# Patient Record
Sex: MALE | Race: WHITE | URBAN - NONMETROPOLITAN AREA
[De-identification: names, ages, dates, MRNs, and addresses within clinical notes are randomized per-mention and may not be internally consistent; named-entity substitution may affect disease eponyms.]

---

## 2017-08-20 PROBLEM — M54.9 BACK PAIN: Status: ACTIVE | Noted: 2017-08-20

## 2017-08-20 PROBLEM — M54.2 NECK PAIN: Status: ACTIVE | Noted: 2017-08-20

## 2017-08-20 PROBLEM — K21.9 GERD (GASTROESOPHAGEAL REFLUX DISEASE): Status: ACTIVE | Noted: 2017-08-20

## 2017-08-20 PROBLEM — E78.2 MIXED HYPERLIPIDEMIA: Status: ACTIVE | Noted: 2017-03-15

## 2018-02-22 PROBLEM — H52.03: Noted: 2018-02-22

## 2018-02-22 PROBLEM — H52.4: Noted: 2018-02-22

## 2018-02-22 PROBLEM — H52.223: Noted: 2018-02-22

## 2019-04-04 ENCOUNTER — IMPORTED ENCOUNTER (OUTPATIENT)
Dept: URBAN - NONMETROPOLITAN AREA CLINIC 1 | Facility: CLINIC | Age: 64
End: 2019-04-04

## 2019-04-04 PROCEDURE — 92015 DETERMINE REFRACTIVE STATE: CPT

## 2019-04-04 PROCEDURE — 92014 COMPRE OPH EXAM EST PT 1/>: CPT

## 2019-04-04 NOTE — PATIENT DISCUSSION
Hyperopia-Discussed diagnosis with patient. Astigmatism-Discussed diagnosis with patient. Presbyopia-Discussed diagnosis with patient. Updated spec Rx given.

## 2019-07-10 PROBLEM — M19.039 LOCALIZED PRIMARY OSTEOARTHRITIS OF WRIST: Status: ACTIVE | Noted: 2019-07-10

## 2019-07-10 PROBLEM — M65.30 ACQUIRED TRIGGER FINGER: Status: ACTIVE | Noted: 2019-07-10

## 2019-07-10 PROBLEM — D48.5 NEOPLASM OF UNCERTAIN BEHAVIOR OF SKIN: Status: ACTIVE | Noted: 2019-07-10

## 2019-11-13 ENCOUNTER — ANESTHESIA EVENT (OUTPATIENT)
Dept: SURGERY | Age: 64
End: 2019-11-13
Payer: COMMERCIAL

## 2019-11-13 NOTE — PERIOP NOTES
PAT - SURGICAL PRE-ADMISSION INSTRUCTIONS    NAME:  Chana Hernandez. TODAY'S DATE:  11/13/2019    SURGERY DATE:  11/14/2019                                  SURGERY ARRIVAL TIME:   1200 TBV    1. Do NOT eat or drink anything, including candy or gum, after MIDNIGHT on 11/13/19 , unless you have specific instructions from your Surgeon or Anesthesia Provider to do so. 2. No smoking on the day of surgery. 3. No alcohol 24 hours prior to the day of surgery. 4. No recreational drugs for one week prior to the day of surgery. 5. Leave all valuables, including money/purse, at home. 6. Remove all jewelry, nail polish, makeup (including mascara); no lotions, powders, deodorant, or perfume/cologne/after shave. 7. Glasses/Contact lenses and Dentures may be worn to the hospital.  They will be removed prior to surgery. 8. Call your doctor if symptoms of a cold or illness develop within 24 ours prior to surgery. 9. AN ADULT MUST DRIVE YOU HOME AFTER OUTPATIENT SURGERY. 10. If you are having an OUTPATIENT procedure, please make arrangements for a responsible adult to be with you for 24 hours after your surgery. 11. If you are admitted to the hospital, you will be assigned to a bed after surgery is complete. Normally a family member will not be able to see you until you are in your assigned bed. 15. Family is encouraged to accompany you to the hospital.  We ask visitors in the treatment area to be limited to ONE person at a time to ensure patient privacy. EXCEPTIONS WILL BE MADE AS NEEDED. 15. Children under 12 are discouraged from entering the treatment area and need to be supervised by an adult when in the waiting room. Special Instructions:    NONE. Patient Prep:    shower with anti-bacterial soap    These surgical instructions were reviewed with PATIENT during the PAT PHONE CALL. Directions:   On the morning of surgery, please go to the [facility]. Enter the building from the [facility] entrance, 1st floor (next to the Emergency Room entrance). Take the elevator to the 2nd floor. Sign in at the Registration Desk.     If you have any questions and/or concerns, please do not hesitate to call:  (Prior to the day of surgery)  [facility] unit:  [phone]  (Day of surgery)  [facility] unit:  [phone]

## 2019-11-14 ENCOUNTER — HOSPITAL ENCOUNTER (OUTPATIENT)
Age: 64
Discharge: HOME | End: 2019-11-15
Attending: UROLOGY | Admitting: UROLOGY
Payer: COMMERCIAL

## 2019-11-14 ENCOUNTER — ANESTHESIA (OUTPATIENT)
Dept: SURGERY | Age: 64
End: 2019-11-14
Payer: COMMERCIAL

## 2019-11-14 DIAGNOSIS — C61 PROSTATE CANCER (HCC): Primary | ICD-10-CM

## 2019-11-14 PROBLEM — N52.9 ED (ERECTILE DYSFUNCTION): Status: ACTIVE | Noted: 2019-11-14

## 2019-11-14 PROCEDURE — C1815 PROS, URINARY SPH, IMP: HCPCS | Performed by: UROLOGY

## 2019-11-14 PROCEDURE — 77030031139 HC SUT VCRL2 J&J -A: Performed by: UROLOGY

## 2019-11-14 PROCEDURE — 77030008683 HC TU ET CUF COVD -A: Performed by: ANESTHESIOLOGY

## 2019-11-14 PROCEDURE — 77030002933 HC SUT MCRYL J&J -A: Performed by: UROLOGY

## 2019-11-14 PROCEDURE — 74011250636 HC RX REV CODE- 250/636: Performed by: NURSE ANESTHETIST, CERTIFIED REGISTERED

## 2019-11-14 PROCEDURE — 76010000131 HC OR TIME 2 TO 2.5 HR: Performed by: UROLOGY

## 2019-11-14 PROCEDURE — 77030010545: Performed by: UROLOGY

## 2019-11-14 PROCEDURE — 77030018836 HC SOL IRR NACL ICUM -A: Performed by: UROLOGY

## 2019-11-14 PROCEDURE — 77030018823 HC SLV COMPR VENO -B: Performed by: UROLOGY

## 2019-11-14 PROCEDURE — 74011000250 HC RX REV CODE- 250: Performed by: NURSE ANESTHETIST, CERTIFIED REGISTERED

## 2019-11-14 PROCEDURE — 65270000029 HC RM PRIVATE

## 2019-11-14 PROCEDURE — 74011000272 HC RX REV CODE- 272: Performed by: UROLOGY

## 2019-11-14 PROCEDURE — 77030034696 HC CATH URETH FOL 2W BARD -A: Performed by: UROLOGY

## 2019-11-14 PROCEDURE — 76210000016 HC OR PH I REC 1 TO 1.5 HR: Performed by: UROLOGY

## 2019-11-14 PROCEDURE — 77030008477 HC STYL SATN SLP COVD -A: Performed by: ANESTHESIOLOGY

## 2019-11-14 PROCEDURE — 74011250636 HC RX REV CODE- 250/636: Performed by: UROLOGY

## 2019-11-14 PROCEDURE — 74011250636 HC RX REV CODE- 250/636: Performed by: STUDENT IN AN ORGANIZED HEALTH CARE EDUCATION/TRAINING PROGRAM

## 2019-11-14 PROCEDURE — 74011250637 HC RX REV CODE- 250/637: Performed by: NURSE ANESTHETIST, CERTIFIED REGISTERED

## 2019-11-14 PROCEDURE — 77030013079 HC BLNKT BAIR HGGR 3M -A: Performed by: ANESTHESIOLOGY

## 2019-11-14 PROCEDURE — 76060000035 HC ANESTHESIA 2 TO 2.5 HR: Performed by: UROLOGY

## 2019-11-14 PROCEDURE — 74011000258 HC RX REV CODE- 258: Performed by: STUDENT IN AN ORGANIZED HEALTH CARE EDUCATION/TRAINING PROGRAM

## 2019-11-14 DEVICE — CONTROL PUMP WITH INHIBIZONE
Type: IMPLANTABLE DEVICE | Site: PENIS | Status: FUNCTIONAL
Brand: AMS 800 ARTIFICIAL URINARY SPHINCTER

## 2019-11-14 RX ORDER — KETOROLAC TROMETHAMINE 30 MG/ML
30 INJECTION, SOLUTION INTRAMUSCULAR; INTRAVENOUS
Status: DISPENSED | OUTPATIENT
Start: 2019-11-14 | End: 2019-11-15

## 2019-11-14 RX ORDER — CEFAZOLIN SODIUM 2 G/50ML
2 SOLUTION INTRAVENOUS
Status: DISCONTINUED | OUTPATIENT
Start: 2019-11-14 | End: 2019-11-14

## 2019-11-14 RX ORDER — PROPOFOL 10 MG/ML
INJECTION, EMULSION INTRAVENOUS AS NEEDED
Status: DISCONTINUED | OUTPATIENT
Start: 2019-11-14 | End: 2019-11-14 | Stop reason: HOSPADM

## 2019-11-14 RX ORDER — FAMOTIDINE 20 MG/1
20 TABLET, FILM COATED ORAL ONCE
Status: COMPLETED | OUTPATIENT
Start: 2019-11-14 | End: 2019-11-14

## 2019-11-14 RX ORDER — DEXAMETHASONE SODIUM PHOSPHATE 4 MG/ML
INJECTION, SOLUTION INTRA-ARTICULAR; INTRALESIONAL; INTRAMUSCULAR; INTRAVENOUS; SOFT TISSUE AS NEEDED
Status: DISCONTINUED | OUTPATIENT
Start: 2019-11-14 | End: 2019-11-14 | Stop reason: HOSPADM

## 2019-11-14 RX ORDER — FENTANYL CITRATE 50 UG/ML
INJECTION, SOLUTION INTRAMUSCULAR; INTRAVENOUS AS NEEDED
Status: DISCONTINUED | OUTPATIENT
Start: 2019-11-14 | End: 2019-11-14 | Stop reason: HOSPADM

## 2019-11-14 RX ORDER — FENTANYL CITRATE 50 UG/ML
50 INJECTION, SOLUTION INTRAMUSCULAR; INTRAVENOUS AS NEEDED
Status: DISCONTINUED | OUTPATIENT
Start: 2019-11-14 | End: 2019-11-14

## 2019-11-14 RX ORDER — NALOXONE HYDROCHLORIDE 0.4 MG/ML
0.4 INJECTION, SOLUTION INTRAMUSCULAR; INTRAVENOUS; SUBCUTANEOUS AS NEEDED
Status: DISCONTINUED | OUTPATIENT
Start: 2019-11-14 | End: 2019-11-15 | Stop reason: HOSPADM

## 2019-11-14 RX ORDER — ONDANSETRON 2 MG/ML
4 INJECTION INTRAMUSCULAR; INTRAVENOUS
Status: DISCONTINUED | OUTPATIENT
Start: 2019-11-14 | End: 2019-11-15 | Stop reason: HOSPADM

## 2019-11-14 RX ORDER — HYDROMORPHONE HYDROCHLORIDE 1 MG/ML
1 INJECTION, SOLUTION INTRAMUSCULAR; INTRAVENOUS; SUBCUTANEOUS
Status: DISCONTINUED | OUTPATIENT
Start: 2019-11-14 | End: 2019-11-15 | Stop reason: HOSPADM

## 2019-11-14 RX ORDER — SODIUM CHLORIDE, SODIUM LACTATE, POTASSIUM CHLORIDE, CALCIUM CHLORIDE 600; 310; 30; 20 MG/100ML; MG/100ML; MG/100ML; MG/100ML
125 INJECTION, SOLUTION INTRAVENOUS CONTINUOUS
Status: DISCONTINUED | OUTPATIENT
Start: 2019-11-14 | End: 2019-11-14

## 2019-11-14 RX ORDER — SODIUM CHLORIDE 0.9 % (FLUSH) 0.9 %
5-40 SYRINGE (ML) INJECTION EVERY 8 HOURS
Status: DISCONTINUED | OUTPATIENT
Start: 2019-11-14 | End: 2019-11-14 | Stop reason: HOSPADM

## 2019-11-14 RX ORDER — OXYCODONE AND ACETAMINOPHEN 5; 325 MG/1; MG/1
1 TABLET ORAL
Status: DISCONTINUED | OUTPATIENT
Start: 2019-11-14 | End: 2019-11-15 | Stop reason: HOSPADM

## 2019-11-14 RX ORDER — SODIUM CHLORIDE 0.9 % (FLUSH) 0.9 %
5-40 SYRINGE (ML) INJECTION AS NEEDED
Status: DISCONTINUED | OUTPATIENT
Start: 2019-11-14 | End: 2019-11-15 | Stop reason: HOSPADM

## 2019-11-14 RX ORDER — SODIUM CHLORIDE 0.9 % (FLUSH) 0.9 %
5-40 SYRINGE (ML) INJECTION EVERY 8 HOURS
Status: DISCONTINUED | OUTPATIENT
Start: 2019-11-14 | End: 2019-11-15 | Stop reason: HOSPADM

## 2019-11-14 RX ORDER — SODIUM CHLORIDE, SODIUM LACTATE, POTASSIUM CHLORIDE, CALCIUM CHLORIDE 600; 310; 30; 20 MG/100ML; MG/100ML; MG/100ML; MG/100ML
25 INJECTION, SOLUTION INTRAVENOUS CONTINUOUS
Status: DISCONTINUED | OUTPATIENT
Start: 2019-11-14 | End: 2019-11-14 | Stop reason: HOSPADM

## 2019-11-14 RX ORDER — SODIUM CHLORIDE 0.9 % (FLUSH) 0.9 %
5-40 SYRINGE (ML) INJECTION AS NEEDED
Status: DISCONTINUED | OUTPATIENT
Start: 2019-11-14 | End: 2019-11-14 | Stop reason: HOSPADM

## 2019-11-14 RX ORDER — HYDROMORPHONE HYDROCHLORIDE 1 MG/ML
0.5 INJECTION, SOLUTION INTRAMUSCULAR; INTRAVENOUS; SUBCUTANEOUS
Status: DISCONTINUED | OUTPATIENT
Start: 2019-11-14 | End: 2019-11-14 | Stop reason: HOSPADM

## 2019-11-14 RX ORDER — SUCCINYLCHOLINE CHLORIDE 100 MG/5ML
SYRINGE (ML) INTRAVENOUS AS NEEDED
Status: DISCONTINUED | OUTPATIENT
Start: 2019-11-14 | End: 2019-11-14 | Stop reason: HOSPADM

## 2019-11-14 RX ORDER — MIDAZOLAM HYDROCHLORIDE 1 MG/ML
INJECTION, SOLUTION INTRAMUSCULAR; INTRAVENOUS AS NEEDED
Status: DISCONTINUED | OUTPATIENT
Start: 2019-11-14 | End: 2019-11-14 | Stop reason: HOSPADM

## 2019-11-14 RX ORDER — HYDROMORPHONE HYDROCHLORIDE 1 MG/ML
0.5 INJECTION, SOLUTION INTRAMUSCULAR; INTRAVENOUS; SUBCUTANEOUS
Status: DISCONTINUED | OUTPATIENT
Start: 2019-11-14 | End: 2019-11-14

## 2019-11-14 RX ORDER — ONDANSETRON 2 MG/ML
4 INJECTION INTRAMUSCULAR; INTRAVENOUS ONCE
Status: DISCONTINUED | OUTPATIENT
Start: 2019-11-14 | End: 2019-11-14

## 2019-11-14 RX ORDER — ONDANSETRON 2 MG/ML
INJECTION INTRAMUSCULAR; INTRAVENOUS AS NEEDED
Status: DISCONTINUED | OUTPATIENT
Start: 2019-11-14 | End: 2019-11-14 | Stop reason: HOSPADM

## 2019-11-14 RX ORDER — LIDOCAINE HYDROCHLORIDE 10 MG/ML
0.1 INJECTION, SOLUTION EPIDURAL; INFILTRATION; INTRACAUDAL; PERINEURAL AS NEEDED
Status: DISCONTINUED | OUTPATIENT
Start: 2019-11-14 | End: 2019-11-14 | Stop reason: HOSPADM

## 2019-11-14 RX ORDER — LIDOCAINE HYDROCHLORIDE 20 MG/ML
INJECTION, SOLUTION EPIDURAL; INFILTRATION; INTRACAUDAL; PERINEURAL AS NEEDED
Status: DISCONTINUED | OUTPATIENT
Start: 2019-11-14 | End: 2019-11-14 | Stop reason: HOSPADM

## 2019-11-14 RX ADMIN — LIDOCAINE HYDROCHLORIDE 50 MG: 20 INJECTION, SOLUTION INTRAVENOUS at 13:34

## 2019-11-14 RX ADMIN — SODIUM CHLORIDE, SODIUM LACTATE, POTASSIUM CHLORIDE, AND CALCIUM CHLORIDE 25 ML/HR: 600; 310; 30; 20 INJECTION, SOLUTION INTRAVENOUS at 12:48

## 2019-11-14 RX ADMIN — FAMOTIDINE 20 MG: 20 TABLET ORAL at 12:47

## 2019-11-14 RX ADMIN — Medication 100 MG: at 13:34

## 2019-11-14 RX ADMIN — FENTANYL CITRATE 100 MCG: 50 INJECTION, SOLUTION INTRAMUSCULAR; INTRAVENOUS at 13:34

## 2019-11-14 RX ADMIN — PROPOFOL 160 MG: 10 INJECTION, EMULSION INTRAVENOUS at 13:34

## 2019-11-14 RX ADMIN — KETOROLAC TROMETHAMINE 30 MG: 30 INJECTION, SOLUTION INTRAMUSCULAR at 16:43

## 2019-11-14 RX ADMIN — MIDAZOLAM 2 MG: 1 INJECTION INTRAMUSCULAR; INTRAVENOUS at 13:29

## 2019-11-14 RX ADMIN — ONDANSETRON 4 MG: 2 SOLUTION INTRAMUSCULAR; INTRAVENOUS at 13:49

## 2019-11-14 RX ADMIN — DEXAMETHASONE SODIUM PHOSPHATE 4 MG: 4 INJECTION, SOLUTION INTRA-ARTICULAR; INTRALESIONAL; INTRAMUSCULAR; INTRAVENOUS; SOFT TISSUE at 13:49

## 2019-11-14 RX ADMIN — TOBRAMYCIN SULFATE 365 MG: 40 INJECTION, SOLUTION INTRAMUSCULAR; INTRAVENOUS at 13:45

## 2019-11-14 RX ADMIN — SODIUM CHLORIDE 1250 MG: 900 INJECTION, SOLUTION INTRAVENOUS at 13:46

## 2019-11-14 NOTE — ANESTHESIA POSTPROCEDURE EVALUATION
Procedure(s):  Cystoscopy ARTIFICIAL URINARY  SPHINCTER. general    Anesthesia Post Evaluation      Multimodal analgesia: multimodal analgesia used between 6 hours prior to anesthesia start to PACU discharge  Patient location during evaluation: bedside  Patient participation: complete - patient participated  Level of consciousness: awake  Pain management: adequate  Airway patency: patent  Anesthetic complications: no  Cardiovascular status: stable  Respiratory status: acceptable  Hydration status: acceptable  Post anesthesia nausea and vomiting:  controlled      Vitals Value Taken Time   /74 11/14/2019  4:48 PM   Temp 36.3 °C (97.4 °F) 11/14/2019  4:00 PM   Pulse 63 11/14/2019  4:55 PM   Resp 8 11/14/2019  4:55 PM   SpO2 97 % 11/14/2019  4:55 PM   Vitals shown include unvalidated device data.

## 2019-11-14 NOTE — PERIOP NOTES
Pre-Op Summary    Pt arrived via car with family/friend and is oriented to time, place, person and situation. Patient with steady gait with none assistive devices. Visit Vitals  /84 (BP 1 Location: Left arm, BP Patient Position: At rest)   Pulse 63   Temp 98.1 °F (36.7 °C)   Resp 18   Ht 5' 10\" (1.778 m)   Wt 83.5 kg (184 lb)   SpO2 100%   BMI 26.40 kg/m²       Peripheral IV located on Left hand . Patients belongings are located with wife. Patient's point of contact is Morelia Ashley and their contact number is: [phone]. They will be in the waiting room. They are able to receive medication information. They will be their ride home.

## 2019-11-14 NOTE — ANESTHESIA PREPROCEDURE EVALUATION
Relevant Problems   No relevant active problems       Anesthetic History   No history of anesthetic complications            Review of Systems / Medical History  Patient summary reviewed and pertinent labs reviewed    Pulmonary  Within defined limits                 Neuro/Psych   Within defined limits           Cardiovascular  Within defined limits                Exercise tolerance: >4 METS     GI/Hepatic/Renal     GERD: well controlled           Endo/Other  Within defined limits           Other Findings   Comments:                  Physical Exam    Airway  Mallampati: I  TM Distance: 4 - 6 cm  Neck ROM: normal range of motion   Mouth opening: Normal     Cardiovascular  Regular rate and rhythm,  S1 and S2 normal,  no murmur, click, rub, or gallop  Rhythm: regular  Rate: normal         Dental  No notable dental hx       Pulmonary  Breath sounds clear to auscultation               Abdominal  GI exam deferred       Other Findings            Anesthetic Plan    ASA: 2  Anesthesia type: general          Induction: Intravenous  Anesthetic plan and risks discussed with: Patient

## 2019-11-14 NOTE — ROUTINE PROCESS
R/T [employer]. Will give BS report at [facility]. VSS. Oscar/Penis wnl. Pain well controlled.

## 2019-11-14 NOTE — H&P
Jhoana Jorgensen MD   Physician   Urology   Progress Notes   Signed   Encounter Date:  10/30/2019                    []Hide copied text    []Ivanver for details    10/30/2019      ASSESSMENT:       ICD-10-CM ICD-9-CM     1. Stress incontinence, male N39.3 788.32 AMB POC URINALYSIS DIP STICK AUTO W/O MICRO         CYSTOURETHROSCOPY   2. 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 4     3. Decreased bladder capacity N32.89 596.89     4. Erectile dysfunction, unspecified erectile dysfunction type N52.9 607.84     5. [facility] C61 185 CYSTOURETHROSCOPY   6. Recurrent UTI N39.0 599.0 URINE C&S      PLAN:    1. UA today unremarkable. 2. UDS 9/9/19 suggestive of hypotonic bladder and reduced bladder capacity. 3. Cystoscopy today 10/30/19: Good Kegels. Excellent VUA, wide open c/w excellent results from DVP. ; VUA without obstruction on retroflexion. No tumors, stones or masses. 4. Reviewed surgical management options for NAHID: urethral sling vs AUS. R/b and postop expectations outlined of each. Patient desires to proceed with AUS placement. Surgery letter placed. 5. As he has a hypotonic bladder with reduced bladder capacity established by UDS, he will likely have to void by Crede or Valsalva to empty bladder completely. 6. Will resume ED conversation after AUS has been placed.      Will call to schedule procedure.     Body mass index is 27.41 kg/m². Patient's BMI is out of the normal parameters. Information about BMI was given to the patient.      For the purpose of documentation, I spent >37 minutes with the patient, and more than half of this time was spent in counseling, education, coordination of care, discussion of coordination of care and discussion of other issues of importance for the care of the patient.          Chief Complaint   Patient presents with    Stress Incontinence       Pt here for Cysto today.  Pt denies any symptoms of UTI. pPt still has same symptoms no change.       HISTORY OF PRESENT ILLNESS:  Dillan Izaguirre is a 59 y.o. male with h/o [id number] [place] 4+3(5) adenocarcinoma of the prostate s/p RALP 10/20/17 who presents today in follow up for NAHID.      Accompanied by wife. Pt presents today for cystoscopy and discuss AUS. He reports progressive NAHID which he currently manages with 4 ppd. Avoids using penile clamp in the PM or when he is more active as it causes penile bleeding and irritation. Regarding his erectile status, he has been using NALINI. Notices a 35-40 degree penile curvature with tumescence and feels a palpable knot. He is slightly frustrated with engaging in sexual activity as he will leak during intercourse. Denies flank pain, gross hematuria, dysuria and is asymptomatic for infection today. No f/c/n/v.      Review of Systems  Constitutional: Fever: No  Skin: Rash: No  HEENT: Hearing difficulty: No  Eyes: Blurred vision: No  Cardiovascular: Chest pain: No  Respiratory: Shortness of breath: No  Gastrointestinal: Nausea/vomiting: No  Musculoskeletal: Back pain: No  Neurological: Weakness: No  Psychological: Memory loss: No  Comments/additional findings:           Past Medical History:   Diagnosis Date    Nocturia      Normal body mass index       25.8    Painful erection      Prostate cancer (HCC)        Past Surgical History:   Procedure Laterality Date    HX LUMBAR LAMINECTOMY         multiple back surgeries      Social History           Tobacco Use    Smoking status: Never Smoker    Smokeless tobacco: Never Used   Substance Use Topics    Alcohol use: No    Drug use: No      No Known Allergies        Family History   Problem Relation Age of Onset    Cancer Mother      Cancer Sister           breast mets to brain             Current Outpatient Medications   Medication Sig Dispense Refill    tadalafil (CIALIS) 20 mg tablet Take 1 Tab by mouth daily as needed (ED).  30 Tab 3    meloxicam (MOBIC) 15 mg tablet Take 15 mg by mouth daily.        multivitamin (ONE A DAY) tablet Take 1 Tab by mouth daily.        Omega-3 Fatty Acids (FISH OIL) 500 mg cap Take  by mouth.        ascorbic acid, vitamin C, (VITAMIN C) 250 mg tablet Take  by mouth.        GARLIC OIL PO Take  by mouth.             PHYSICAL EXAMINATION:   Visit Vitals  /80   Ht 5' 10\" (1.778 m)   Wt 191 lb (86.6 kg)   BMI 27.41 kg/m²      Constitutional: WDWN, Pleasant and appropriate affect, No acute distress. CV:  No peripheral swelling noted  Respiratory: No respiratory distress or difficulties  Abdomen:  No abdominal masses or tenderness. No CVA tenderness. No inguinal hernias noted.  Male 10/30/19: NEMG  Skin: No jaundice. Neuro/Psych:  Alert and oriented x 3, affect appropriate. Lymphatic:   No enlarged inguinal lymph nodes.       REVIEW OF LABS AND IMAGING:          Results for orders placed or performed in visit on 10/30/19   CYSTOURETHROSCOPY     Impression     CYSTOSCOPY PROCEDURE NOTE     Procedure: Cystoscopy  Date of Procedure: 10/30/2019   Patient Name: Sergio Copeland. Sex: male              MRN: [id number]   YOB: 1955        Age:  59 y.o. Preoperative diagnosis:  Postoperative diagnosis: Stress incontinence, male  (primary encounter diagnosis)  Hypotonic bladder  Decreased bladder capacity  Erectile dysfunction, unspecified erectile dysfunction type  Malignant neoplasm of prostate (hcc)  Indications: This procedure has been fully reviewed with the patient, and written informed consent has been obtained.     Procedure: The patient was identified and surgical site verification was performed prior to obtaining consent. After performing the appropriate time-out and prepping and draping the patient in the standard fashion, the patient was positioned in supine. Local anesthesia was performed: 10 cc of 2% lidocaine jelly was instilled transurethrally. Flexible cystourethroscopy was then performed.     Meatus: nl  Urethra: nl  Good Kegels. Excellent VUA, wide open c/w excellent results from DVP;   VUA without obstruction on retroflexion. No tumors, stones or masses. Trigone: Normal  Trabeculation:normal  Diverticuli: No  Lesion: none     Complications:  None      Houston Rader MD  10/30/2019   AMB POC URINALYSIS DIP STICK AUTO W/O MICRO   Result Value Ref Range     Color (UA POC) Yellow       Clarity (UA POC) Clear       Glucose (UA POC) Negative Negative     Bilirubin (UA POC) Negative Negative     Ketones (UA POC) Trace Negative     Specific gravity (UA POC) 1.030 1.001 - 1.035     Blood (UA POC) Negative Negative     pH (UA POC) 5.5 4.6 - 8.0     Protein (UA POC) Negative Negative     Urobilinogen (UA POC) 0.2 mg/dL 0.2 - 1     Nitrites (UA POC) Negative Negative     Leukocyte esterase (UA POC) Negative Negative      UDS 9/9/19  Cystometrogram         Sensation normal    First sensation 89 ml    Strong desire 142 ml    Capacity 228 ml    Compliance: normal       Detrusor overactivity:no       Leakage: no      Stress Incontinence    Did NAHID manuvers in both sitting and standing position at volume of 188 ml after Pt stated strong desire to void. Valsalva: no  Type:N/A   Cough: no  Type:N/A   Stress induced overactivity: Cough: no  Valsalva: no      Micturition  Voided Volume: 126 ml    Detrusor Pressure: At peak flow rate: 0 cm H2O (due to valsalva)    Maximum: 18 cm H2O   Duration Contraction: <60 sec interrupted     Post Void Contraction: no     Valsalva: yes   External Sphincter: normal   Max Flow Rate: 13.5 ml/sec   Average Flow Rate 6.5 ml/sec   Voiding Time: 1:02  minutes   Flow Pattern: continuous   Post Void Residual: 120 ml obtained with a 60 cc syringe via Pves catheter.       Imaging Report Reviewed? YES     Images Reviewed? NO        Other Lab Data Reviewed?    YES     CC: MD Houston Rolle MD  THE [facility]  A Division of Urology of [facility] Documentation is provided with the assistance of Khanh Ragland medical scribe for Angy Roper MD on 10/30/2019. Electronically signed by José Miguel Bryson MD at 11/07/19 2635   Note Details     Author José Miguel Bryson MD File Time 11/07/19 7912   Author Type Physician Status Signed   Last  José Miguel Bryson MD Specialty Urology       Office Visit on 10/30/2019          Revision History          Detailed Report      Date of Surgery Update:  Caity Payton was seen and examined. History and physical has been reviewed. The patient has been examined.  There have been no significant clinical changes since the completion of the originally dated History and Physical.    Signed By: Angy Roper MD     November 14, 2019 1:10 PM       Tobramycin and vancomycin ordered instead of cefazolin

## 2019-11-14 NOTE — BRIEF OP NOTE
BRIEF OPERATIVE NOTE    Date of Procedure: 11/14/2019   Preoperative Diagnosis: stress incontinence male  n39.3  Postoperative Diagnosis: stress incontinence male  n39.3    Procedure(s):  Cystoscopy ARTIFICIAL URINARY  SPHINCTER  Surgeon(s) and Role:     Alva Santacruz MD - Primary         Surgical Assistant: Dipti Burns MD    Surgical Staff:  Circ-1: Rosas Love RN  Circ-2: Gracy Augustin Tech-1: Moises KEANE  Event Time In Time Out   Incision Start  2:07 PM    Incision Close  3:47 PM      Anesthesia: General   Estimated Blood Loss: 10 ml  Specimens: * No specimens in log *   Findings: Uneventful AUS placement   Complications: none  Implants:   Implant Name Type Inv.  Item Serial No.  Lot No. LRB No. Used Action   SPHINCTER URIN PRES 61-70CM --  - [id number]  SPHINCTER URIN PRES 61-70CM --   [employer] [id number] N/A 1 Implanted   PUMP CTRL URIN PROS W/IZ --  - [id number]  PUMP CTRL URIN PROS W/IZ --   [place] [facility] [id number] N/A 1 Implanted   CUFF OCCL SPHIN URIN 4.5CM --  - [id number]  CUFF OCCL SPHIN URIN 4.5CM --   [employer] [facility] [id number] N/A 1 Implanted

## 2019-11-15 VITALS
SYSTOLIC BLOOD PRESSURE: 122 MMHG | RESPIRATION RATE: 16 BRPM | WEIGHT: 184 LBS | DIASTOLIC BLOOD PRESSURE: 66 MMHG | TEMPERATURE: 98.4 F | OXYGEN SATURATION: 94 % | BODY MASS INDEX: 26.34 KG/M2 | HEART RATE: 60 BPM | HEIGHT: 70 IN

## 2019-11-15 PROBLEM — N52.9 ERECTILE DYSFUNCTION: Status: ACTIVE | Noted: 2019-11-15

## 2019-11-15 PROCEDURE — 74011250636 HC RX REV CODE- 250/636: Performed by: UROLOGY

## 2019-11-15 PROCEDURE — 74011250636 HC RX REV CODE- 250/636: Performed by: STUDENT IN AN ORGANIZED HEALTH CARE EDUCATION/TRAINING PROGRAM

## 2019-11-15 PROCEDURE — 74011000258 HC RX REV CODE- 258: Performed by: STUDENT IN AN ORGANIZED HEALTH CARE EDUCATION/TRAINING PROGRAM

## 2019-11-15 PROCEDURE — 51798 US URINE CAPACITY MEASURE: CPT

## 2019-11-15 RX ORDER — HYDROCODONE BITARTRATE AND ACETAMINOPHEN 5; 300 MG/1; MG/1
1 TABLET ORAL
Qty: 10 TAB | Refills: 0 | Status: SHIPPED | OUTPATIENT
Start: 2019-11-15 | End: 2019-11-18

## 2019-11-15 RX ORDER — DOCUSATE SODIUM 100 MG/1
100 CAPSULE, LIQUID FILLED ORAL 2 TIMES DAILY
Qty: 60 CAP | Refills: 0 | Status: SHIPPED | OUTPATIENT
Start: 2019-11-15 | End: 2020-01-09 | Stop reason: ALTCHOICE

## 2019-11-15 RX ORDER — SULFAMETHOXAZOLE AND TRIMETHOPRIM 800; 160 MG/1; MG/1
1 TABLET ORAL 2 TIMES DAILY
Qty: 28 TAB | Refills: 0 | Status: SHIPPED | OUTPATIENT
Start: 2019-11-15 | End: 2019-11-26 | Stop reason: SDUPTHER

## 2019-11-15 RX ADMIN — SODIUM CHLORIDE 1250 MG: 900 INJECTION, SOLUTION INTRAVENOUS at 02:16

## 2019-11-15 RX ADMIN — TOBRAMYCIN SULFATE 365 MG: 40 INJECTION, SOLUTION INTRAMUSCULAR; INTRAVENOUS at 13:17

## 2019-11-15 RX ADMIN — Medication 10 ML: at 13:17

## 2019-11-15 RX ADMIN — SODIUM CHLORIDE 1250 MG: 900 INJECTION, SOLUTION INTRAVENOUS at 13:16

## 2019-11-15 NOTE — PROGRESS NOTES
Rec'd call from Marcy with Utilization Review stating that pt had been recommended for downgrade to outpatient due to procedure not being an \"inpatient only\" and no pre-auth. [facility] has tried to reach Dr. Ag Bolds office to advise. This CM informed her I am not on that floor today so unlikely to see Dr who will round on patient, but will advise 2C staff and and place note on chart. Shared this information with Veronica Rivas RN and [facility] unit secretary, and placed note on physical chart. Also placed FYI on chart.

## 2019-11-15 NOTE — PROGRESS NOTES
PVR 0, will continue to monitor,,, pt IV ABT infusing now, plan for pt to be discharged afterwards, peggy steele

## 2019-11-15 NOTE — DISCHARGE INSTRUCTIONS
Patient Education        Erectile Dysfunction: Care Instructions  Your Care Instructions    A man has erectile dysfunction (ED) when he routinely can't get or keep an erection that allows satisfactory sex. He may not be able to have an erection at any time. Or he may not be able to have one that is firm enough or lasts long enough to complete intercourse. ED is not the same as having trouble getting an erection now and then. That's common. It happens to most men at some time. ED can be caused by problems with the blood vessels, nerves, or hormones. It can be caused by diabetes, heart disease, and injuries. Nerve disorders, such as multiple sclerosis or Parkinson's disease, can also cause it. ED can also be caused by medicines, alcohol, and tobacco. Or it may be caused by depression, stress, grief, or relationship problems. Follow-up care is a key part of your treatment and safety. Be sure to make and go to all appointments, and call your doctor if you are having problems. It's also a good idea to know your test results and keep a list of the medicines you take. How can you care for yourself at home?   Lifestyle    · Limit alcohol. Have no more than 2 drinks a day.     · Do not smoke. Smoking makes it harder for the blood vessels in the penis to relax and let blood flow in. If you need help quitting, talk to your doctor about stop-smoking programs and medicines. These can increase your chances of quitting for good.     · Do not use cocaine, heroin, or other illegal drugs.     · Try to reduce stress.     · Give yourself time to adjust to change. Changes in your job, family, relationships, home life, and other areas can cause stress. And stress can cause erection problems.    Work with your partner    · Don't assume that you know what your partner likes when it comes to sex. You may be wrong. Talk about what each of you does and does not enjoy.     · Make time outside of the bedroom to talk about your sex life.  If you avoid sex because you are afraid of having erection problems, your partner may worry that you are no longer interested.     · If you and your partner have trouble talking about sex, see a therapist who can help you talk about it. Reading books with your partner about sexual health may also help.     · Relax. Take time for more foreplay. Worrying about your erections may only make things worse. Medicines    · Tell your doctor about all the medicines that you take. ? Some medicines can cause erection problems. ? Some medicines can have dangerous interactions with medicines that are prescribed for ED, including over-the-counter medicines and herbal products.     · Be safe with medicines. Take your medicines exactly as prescribed. Call your doctor if you think you are having a problem with your medicine.     · Talk to your doctor about trying a medicine to help you keep an erection. This could be a medicine such as Viagra, Levitra, or Cialis. If you have a heart problem, ask your doctor if these are safe for you. Do not take these medicines if you take nitroglycerin or other nitrate medicine. When should you call for help? Call your doctor now or seek immediate medical care if:    · You took a medicine for erectile dysfunction and you have an erection that lasts longer than 3 hours.    Watch closely for changes in your health, and be sure to contact your doctor if you have any problems. Where can you learn more? Go to http://marilni-alex.info/. Enter [id number] in the search box to learn more about \"Erectile Dysfunction: Care Instructions. \"  Current as of: May 28, 2019  Content Version: 12.2  © 0588-0266 [employer], [employer]. Care instructions adapted under license by [employer] (which disclaims liability or warranty for this information).  If you have questions about a medical condition or this instruction, always ask your healthcare professional. Chelsea Thompson disclaims any warranty or liability for your use of this information. DISCHARGE SUMMARY from Nurse    PATIENT INSTRUCTIONS:    After general anesthesia or intravenous sedation, for 24 hours or while taking prescription Narcotics:  · Limit your activities  · Do not drive and operate hazardous machinery  · Do not make important personal or business decisions  · Do  not drink alcoholic beverages  · If you have not urinated within 8 hours after discharge, please contact your surgeon on call. Report the following to your surgeon:  · Excessive pain, swelling, redness or odor of or around the surgical area  · Temperature over 100.5  · Nausea and vomiting lasting longer than 4 hours or if unable to take medications  · Any signs of decreased circulation or nerve impairment to extremity: change in color, persistent  numbness, tingling, coldness or increase pain  · Any questions    What to do at Home:  Recommended activity: Activity as tolerated. *  Please give a list of your current medications to your Primary Care Provider. *  Please update this list whenever your medications are discontinued, doses are      changed, or new medications (including over-the-counter products) are added. *  Please carry medication information at all times in case of emergency situations. These are general instructions for a healthy lifestyle:    No smoking/ No tobacco products/ Avoid exposure to second hand smoke  Surgeon General's Warning:  Quitting smoking now greatly reduces serious risk to your health.     Obesity, smoking, and sedentary lifestyle greatly increases your risk for illness    A healthy diet, regular physical exercise & weight monitoring are important for maintaining a healthy lifestyle    You may be retaining fluid if you have a history of heart failure or if you experience any of the following symptoms:  Weight gain of 3 pounds or more overnight or 5 pounds in a week, increased swelling in our hands or feet or shortness of breath while lying flat in bed. Please call your doctor as soon as you notice any of these symptoms; do not wait until your next office visit. WHAT YOU NEED TO KNOW:    Urinary sphincter replacement is surgery to replace your urinary sphincter with an artificial urinary sphincter (AUS). The urinary sphincter is a muscle that surrounds your urethra. The sphincter squeezes the urethra to keep urine in the bladder until it is time to urinate. An AUS device has a balloon reservoir, a cuff, and a pump. When the AUS is turned on, liquid inside the balloon flows into the cuff. When the cuff is filled, it squeezes your urethra to prevent urine from leaking out of your bladder. To urinate, you press the pump. This relaxes the cuff and allows urine to flow out of your bladder. DISCHARGE INSTRUCTIONS:    Seek care immediately if:  You cannot urinate. You cannot find the pump to open your AUS. Urine is leaking from your surgery area. Call your doctor or surgeon if:  You have a fever. Your surgery area is red, swollen, or draining pus. You have pain when you urinate, or you see blood in your urine. You have to press the pump more times than usual to open the AUS. Your AUS cuff is on and closed, but you keep leaking urine. You do not think you are emptying your bladder completely when you urinate. You have questions or concerns about your condition or care. Medicines: You may need any of the following:  Antibiotics help prevent or treat a bacterial infection. Prescription pain medicine may be given. Ask your healthcare provider how to take this medicine safely. Some prescription pain medicines contain acetaminophen. Do not take other medicines that contain acetaminophen without talking to your healthcare provider. Too much acetaminophen may cause liver damage. Prescription pain medicine may cause constipation.  Ask your healthcare provider how to prevent or treat constipation. Take your medicine as directed. Contact your healthcare provider if you think your medicine is not helping or if you have side effects. Tell him or her if you are allergic to any medicine. Keep a list of the medicines, vitamins, and herbs you take. Include the amounts, and when and why you take them. Bring the list or the pill bottles to follow-up visits. Carry your medicine list with you in case of an emergency. Care for the surgery area:  When your healthcare provider says it is okay to bathe, carefully wash the area with soap and water. Dry the area and put on new, clean bandages as directed. Change your bandages when they get wet or dirty. Activity:  Avoid activities and exercises that put pressure on your genital area. Ask your healthcare provider or surgeon which activities are best for you. Do not have sex for at least 6 weeks after surgery. This gives the surgery area time to heal. Ask when you can return to work and other daily activities. When to turn off your AUS:  Turn off your AUS as directed. You may need to turn off your AUS at the following times: At night to decrease pressure on your urethra and prevent damage   For procedures that involve your urinary system   If you are a woman, the last 3 months of pregnancy to prevent cuff damage from the baby's head    Follow up with your doctor or surgeon as directed: You will need to return to have your surgery area checked and to have the AUS turned on. Write down your questions so you remember to ask them during your visits. © Copyright [employer] 2019 Information is for End User's use only and may not be sold, redistributed or otherwise used for commercial purposes. All illustrations and images included in CareNotes® are the copyrighted property of [employer].D.A.M., Inc. or [employer]   The above information is an  only. It is not intended as medical advice for individual conditions or treatments.  Talk to your doctor, nurse or pharmacist before following any medical regimen to see if it is safe and effective for you. The discharge information has been reviewed with the patient. The patient verbalized understanding. Discharge medications reviewed with the patient and appropriate educational materials and side effects teaching were provided.   ___________________________________________________________________________________________________________________________________

## 2019-11-15 NOTE — PROGRESS NOTES
Problem: Pain  Goal: *Control of Pain  Outcome: Progressing Towards Goal  Goal: *PALLIATIVE CARE:  Alleviation of Pain  Outcome: Progressing Towards Goal     Problem: Falls - Risk of  Goal: *Absence of Falls  Description  Document [facility] Fall Risk and appropriate interventions in the flowsheet. Outcome: Progressing Towards Goal  Note:   Fall Risk Interventions:  Mobility Interventions: Patient to call before getting OOB    Mentation Interventions: Adequate sleep, hydration, pain control    Medication Interventions: Patient to call before getting OOB, Teach patient to arise slowly    Elimination Interventions: Call light in reach, Patient to call for help with toileting needs              Problem: Patient Education: Go to Patient Education Activity  Goal: Patient/Family Education  Outcome: Progressing Towards Goal     Problem: Infection - Risk of, Urinary Catheter-Associated Urinary Tract Infection  Goal: *Absence of infection signs and symptoms  Outcome: Progressing Towards Goal     Problem: Patient Education: Go to Patient Education Activity  Goal: Patient/Family Education  Outcome: Progressing Towards Goal     Problem: Pressure Injury - Risk of  Goal: *Prevention of pressure injury  Description  Document Constantine Scale and appropriate interventions in the flowsheet. Outcome: Progressing Towards Goal  Note:   Pressure Injury Interventions:             Activity Interventions: Increase time out of bed         Nutrition Interventions: Document food/fluid/supplement intake                     Problem: Patient Education: Go to Patient Education Activity  Goal: Patient/Family Education  Outcome: Progressing Towards Goal

## 2019-11-15 NOTE — PROGRESS NOTES
1930  Received bedside report from [facility]. Patient resting in bed comfortably. No complaints of pain. Call bell within reach. 0630 Patient slept well throughout night  Removed fried, patient up walking around room. No complaints of pain. Patient back in bed resting comfortably.

## 2019-11-15 NOTE — PROGRESS NOTES
Urology Progress Note        Assessment/Plan:     Active Problems:    ED (erectile dysfunction) (2019)        Status Post:  Procedure(s):  Cystoscopy ARTIFICIAL URINARY  SPHINCTER     Plan:  Discharge home    Subjective:     Daily Progress Note: 11/15/2019 11:56 AM    1800 Zw[address]. is 1 Day Post-Op and doing satisfactory. He reports pain is well controlled. He has no complaints. He is tolerating a solid diet and ambulating with assistance. Patient is voiding without difficulty. Objective:     Visit Vitals  /41 (BP Patient Position: At rest)   Pulse 67   Temp 98.4 °F (36.9 °C)   Resp 16   Ht 5' 10\" (1.778 m)   Wt 184 lb (83.5 kg)   SpO2 94%   BMI 26.40 kg/m²        Temp (24hrs), Av °F (36.7 °C), Min:97.4 °F (36.3 °C), Max:98.4 °F (36.9 °C)      Intake and Output:   1901 - 11/15 0700  In: 950 [I.V.:950]  Out: 950 [Urine:950]  11/15 0701 - 11/15 1900  In: -   Out: 250 [Urine:250]    Physical Exam:   General appearance: alert, cooperative, no distress, appears stated age  Abdomen: soft, non-tender. Bowel sounds normal. No masses,  no organomegaly    Incision: clean and dry    Data Review:    No results found for this or any previous visit (from the past 24 hour(s)).       Signed By:     Tomer Thao MD   Urologic Oncologist  [facility]  [facility]  Office [phone] Ext [phone]                          November 15, 2019

## 2019-11-15 NOTE — PROGRESS NOTES
Bedside shift change report given to [address] [place] [address] Rocky (oncoming nurse) by Seth Arciniega (offgoing nurse). Report included the following information SBAR, Kardex, Intake/Output and MAR.

## 2019-11-15 NOTE — DISCHARGE SUMMARY
Discharge Summary     Patient ID:  Maykel Orellana  [id number]   59 y.o.  1955    Admit date: 11/14/2019    Discharge Date: 11/15/2019      Admitting Physician: Abhilash Gaspar MD     Discharge Physician: Kate Farr MD    Admission Diagnoses: ED (erectile dysfunction) [N52.9]    Last Procedure: Procedure(s):  Cystoscopy ARTIFICIAL URINARY  SPHINCTER    Discharge Diagnoses: Active Problems:    ED (erectile dysfunction) (11/14/2019)         Consults: None    RELAVENT LABS ( within last 72 hrs):    No results found for this or any previous visit (from the past 72 hour(s)). Significant Diagnostic Studies: none     Hospital Course:   Normal hospital course for this procedure. Condition on Discharge: Satisfactory    Disposition: Home    Patient Instructions:   Current Discharge Medication List      START taking these medications    Details   trimethoprim-sulfamethoxazole (BACTRIM DS) 160-800 mg per tablet Take 1 Tab by mouth two (2) times a day. Qty: 28 Tab, Refills: 0      HYDROcodone-acetaminophen (XODOL) 5-300 mg tablet Take 1 Tab by mouth every six (6) hours as needed for Pain for up to 3 days. Max Daily Amount: 4 Tabs. Qty: 10 Tab, Refills: 0    Associated Diagnoses: Prostate cancer ([address]      docusate sodium (COLACE) 100 mg capsule Take 1 Cap by mouth two (2) times a day for 90 days. Qty: 60 Cap, Refills: 0         CONTINUE these medications which have NOT CHANGED    Details   meloxicam (MOBIC) 15 mg tablet Take 15 mg by mouth daily as needed. Associated Diagnoses: Prostate cancer ([address]      multivitamin (ONE A DAY) tablet Take 1 Tab by mouth daily. Omega-3 Fatty Acids (FISH OIL) 500 mg cap Take  by mouth. ascorbic acid, vitamin C, (VITAMIN C) 250 mg tablet Take  by mouth. GARLIC OIL PO Take  by mouth.      tadalafil (CIALIS) 20 mg tablet Take 1 Tab by mouth daily as needed (ED). Qty: 30 Tab, Refills: 3             Diet: Reference my discharge instructions.     Activity: Reference my discharge instructions. Follow-up Appointments   Procedures    FOLLOW UP VISIT Appointment in: Two Weeks     Standing Status:   Standing     Number of Occurrences:   1     Order Specific Question:   Appointment in     Answer:    Two Weeks            Signed:  Enrique Cano MD  November 15, 2019  12:02 PM     Ivone Friend MD   Urologic Oncologist  [facility]  [facility]  Office [phone] Ext [phone]

## 2019-11-15 NOTE — PROGRESS NOTES
Problem: Discharge Planning  Goal: *Discharge to safe environment  Outcome: Resolved/Met  Plan is home    Reason for Admission:   Stress incontinence male                   RRAT Score:     7                Plan for utilizing home health:      no                    Current Advanced Directive/Advance Care Plan: no                         Transition of Care Plan:  Chart reviewed and pt verified demographics. He lives with his wife and is independent with ADL. He uses no DME. He is discharged, his transition plan is home. Patient has designated ____spouse____________________ to participate in his/her discharge plan and to receive any needed information. Name: Vijaya Wing  Address:  Phone number:      [phone]    Care Management Interventions  PCP Verified by CM: No  Palliative Care Criteria Met (RRAT>21 & CHF Dx)?: No  Mode of Transport at Discharge: Other (see comment)(Son and his girlfriend will drive pt and wife home)  Transition of Care Consult (CM Consult): Discharge Planning  MyChart Signup: No  Discharge Durable Medical Equipment: No  Physical Therapy Consult: No  Occupational Therapy Consult: No  Speech Therapy Consult: No  Current Support Network: Lives with Spouse  Confirm Follow Up Transport: Family  Plan discussed with Pt/Family/Caregiver: Yes  Discharge Location  Discharge Placement: Home     Patient and/or next of kin has been given and has signed the [facility] Outpatient Observation  Notification letter and all questions answered. Copy of this notice given to patient and copy placed on chart. Patient and/or next of kin has been given the Outpatient Observation Information and Notification letter and all questions answered. Marcello Cazares.  REYES Sargent  [facility]  [phone], Pager [phone]  [email or web address]

## 2019-11-19 NOTE — PROCEDURES
OPERATION    LOCATION OF PATIENT:  [facility] MAIN OR    DATE OF OPERATION:  11/14/19    SURGEON:  Jorge L Osman MD    PREOPERATIVE DIAGNOSIS:  Stress urinary incontinence, Post prostatectomy. POSTOPERATIVE DIAGNOSIS:  Stress urinary incontinence, Post prostatectomy. PROCEDURE:  1. Cystoscopy. 2. Placement of artificial urinary sphincter (AUS ). ASSISTANT:  Dom Kwon MD    STAFF:  Circ-1: Paddy Ortega RN  Circ-2: Arlene Freeman Tech-1: Omar Cornelius    ANESTHESIA:  General orotracheal.  Anesthesiologist: Leo Astorga MD  CRNA: Navjot Cornejo CRNA; Yosef Gottlieb CRNA    ESTIMATED BLOOD LOSS:  50 cc. IV FLUIDS:  Crystalloid. SPECIMENS:  None    DRAINS:  1. A 12-J[place]ican Oscar. FINDINGS:  1. AUS    2. A 61-70 cm cuff filled with 24 ml of injectable isotonic NS. Implant Name Type Inv. Item Serial No.  Lot No. LRB No. Used   SPHINCTER URIN PRES 61-70CM --  - [id number]  SPHINCTER URIN PRES 61-70CM --   [facility] [id number] N/A 1   PUMP CTRL URIN PROS W/IZ --  - [id number]  PUMP CTRL URIN PROS W/IZ --   [facility] [id number] N/A 1   CUFF OCCL SPHIN URIN 4.5CM --  - [id number]  CUFF OCCL SPHIN URIN 4.5CM --   [facility] [id number] N/A 1       INDICATIONS:  Mr. Dillan Izaguirre  is a pleasant 59y.o.  y.o.-year-old gentleman with a history of prostate cancer S/P Robotic Asssited Laparoscopic Radical Prostatectomy in 10/20/17 with consequent NAHID. He has failed conservative therapies, including PT, and after being counseled with options he elected to have placed an artificial urinary sphincter. All questions were answered. Consent was signed. DESCRIPTION OF OPERATION:  After appropriate informed consent had been obtained, the patient was transferred to the operative suite. He was placed supine on the examination table. Care was taken to pad all pressure points. Anesthesia was induced. Preoperative antibiotics were administered. The patient was positioned into dorsal lithotomy, using [place] Cocking. Great care was taken to pad all pressure points. The suprapubic and perineal area was clipped free of hair. Lower abdomen and genitalia were prepped and draped for cystoscopy. A time-out compliant with universal protocol was taken. A midline incision was marked on the perineum and made with a #15 blade. Electrocautery was used to deepen this incision and the ischiocavernosus muscle was exposed in the midline. We then split the muscle in the midline with a combination of electrocautery and the scissor. The Ethan-Brown perineal retractor was placed for improved exposure. The bulbocavernosus muscle was divided in the midline and retracted laterally. The urethra was cleared off. The urethra was mobilized off of the corporal bodies and a right angle was passed beneath the urethra. The Penrose drain was used for retraction. At this point we were ready to measure the cuff, and the measuring tape was used. We measured 4.5 cm diameter and the AUS was opened and prepared at the back table. Care was taken to remove all air bubbles from cuff, reservoir and pump. We decided to open a 4.5 cm cuff, tis was placed around the corpus spongiosum and fitted well. A 14 fr. Oscar catheter passed without retention. We were satisfied with the result. Next we addressed the placement of the reservoir and the pump. A 2-inch suprapubic incision was made to the right. The skin incised with scalpel, the SQ and sheila with bovie electrocautery until the fascia was exposed. The fascia was incised with scalpel and the muscle divided with tonsils, in order to create a pocket to expose the fascia transversalis, which was incised and the Retzius space entered to create a pocket for the reservoir. The reservoir was located and filled with 24 cc of injectable isotonic NS. We turned our attention to the pump. From the abdominal incision, a subdartos pocket was created in the scrotum and the pump was placed within itto the right hemiscrotum. We were all satisfied with the location of the AUS components. At this point, the tubing was trimmed to the appropriate size and the quick connect devices were used to make the connection. The tubing was then buried deep in the suprapubic area with several layers of Colles fascia approximated with 3-0 Vycril suture. We then closed the skin with a running 4-0 Monocryl. We then closed the perineal wound in layers: ischiocavernosus muscle and Colles fascia with 3/0 Vicryl and SQ tissue with same suture. The skin was clsed with a running 4/0 Monocryl. The patient was cleaned and dried. OpSite dressing  was applied to the incision. A 14 Fr. Oscar catheter was placed to gravity drainage. Urine was clear. At this point the case was ended. The patient was awoken from general anesthesia and transferred to the PACU in stable condition. DISPOSITION:  He will be recovered in the PACU and then admitted for overnight observation. A VT will be performed in the AM and the patient D/C'd if passes it. 911 [facility] Reconstructive Surgery  a division of Urology of [address].    Juliana Restrepo [facility]  [phone]  Pager [phone]

## 2019-12-09 NOTE — PERIOP NOTES
Dilaudid 0.5mg pulled at same time as Toradol. Toradol given and scanned and Dilaudid given right after at 1643 but not scanned. Full dose given no waste needed.

## 2020-12-15 PROBLEM — M50.20 CERVICAL DISC HERNIATION: Status: ACTIVE | Noted: 2020-12-15

## 2021-08-03 PROBLEM — N52.9 ED (ERECTILE DYSFUNCTION): Status: RESOLVED | Noted: 2019-11-14 | Resolved: 2021-08-03

## 2021-09-23 ENCOUNTER — IMPORTED ENCOUNTER (OUTPATIENT)
Dept: URBAN - NONMETROPOLITAN AREA CLINIC 1 | Facility: CLINIC | Age: 66
End: 2021-09-23

## 2021-09-23 PROCEDURE — S0621 ROUTINE OPHTHALMOLOGICAL EXA: HCPCS

## 2021-09-23 NOTE — PATIENT DISCUSSION
"Hyperopia-Discussed diagnosis with patient. Astigmatism-Discussed diagnosis with patient. Presbyopia-Discussed diagnosis with patient. ""Updated spec Rx given. Recommend lens that will provide comfort as well as protect safety and health of eyes. "

## 2021-09-29 PROBLEM — M50.30 DEGENERATION OF CERVICAL INTERVERTEBRAL DISC: Status: ACTIVE | Noted: 2020-10-21

## 2021-09-29 PROBLEM — M96.1 LUMBAR POST-LAMINECTOMY SYNDROME: Status: ACTIVE | Noted: 2020-10-21

## 2022-04-10 ASSESSMENT — VISUAL ACUITY
OD_CC: J1
OD_CC: J1
OD_SC: 20/20
OS_CC: J1
OS_CC: J1
OS_SC: 20/20
OS_SC: 20/20
OD_SC: 20/20

## 2022-04-10 ASSESSMENT — TONOMETRY
OS_IOP_MMHG: 16
OD_IOP_MMHG: 15
OD_IOP_MMHG: 12
OS_IOP_MMHG: 12

## 2022-05-25 NOTE — PATIENT DISCUSSION
Patient advised cataract APPEARS BORDERLINE VISUALLY SIGNIFICANT but patient is NOT HAVING VISUAL DIFFICULTY and wants TO WAIT on cataract evaluation.

## 2022-05-25 NOTE — PATIENT DISCUSSION
Cataract APPEARS BORDERLINE VISUALLY SIGNIFICANT and patient advised to see a cataract surgeon for evaluation and treatment.

## 2022-09-09 NOTE — PATIENT DISCUSSION
5 20 16 INSTRUCTED IN RETURN IF VA DECREASED OR DISTORTION INCREASES.
BMI below normal limits, recommended improve diet and follow up with internist.
Based on patient's symptoms, exam, diagnostic testing and records the following are my findings and recommendations.
Cataract APPEARS BORDERLINE VISUALLY SIGNIFICANT and patient advised to see a cataract surgeon for evaluation and treatment.
DISCUSSED OPTION OF OBSERVATION VS TX.
DISCUSSED OPTIONS OF TX VS FOLLOW.
Discussed condition and exacerbating conditions/situations (e.g., dry/arid environments, overhead fans, air conditioners, side effect of medications).
Does NOT APPEAR VISUALLY SIGNIFICANT.
ERM appears BORDERLINE VISUALLY SIGNIFICANT.
Extended ophthalmoscopy performed in addition to routine ophthalmoscopy to more carefully evaluate this dx, hence medically necessary.
Patient advised cataract APPEARS BORDERLINE VISUALLY SIGNIFICANT but patient is NOT HAVING VISUAL DIFFICULTY and wants TO WAIT on cataract evaluation.
Plaque/Scar temporal to Macula.
Recommended observation.
Recommended use of artificial tears TID and artificial tear ointment at bedtime.
Requesting prior historical medical records.
SEE ERM OD.
We reviewed the signs and symptoms of retinal tear/retinal detachment and the importance of prompt evaluation should there be increasing floaters, new flashing lights, or decreasing peripheral vision in either eye at any time.
Yes

## 2022-11-07 NOTE — PATIENT DISCUSSION
Continue following with Retina Specialist as scheduled. RTC here in 1 year for Exam, Optos, and Mac OCT, sooner if problems or changes.

## 2022-11-07 NOTE — PATIENT DISCUSSION
Baseline Optos ordered and performed today. Round hole noted OS that is likely old due to surrounding pigment but will refer to retina for evaluation and treatment.

## 2023-07-09 NOTE — PATIENT DISCUSSION
Does NOT APPEAR VISUALLY SIGNIFICANT. Pharmacokinetic Initial Assessment: IV Vancomycin    Assessment/Plan:    Initiate intravenous vancomycin with loading dose of 1250 mg once followed by a maintenance dose of vancomycin 750mg IV every 24 hours  Desired empiric serum trough concentration is 15 to 20 mcg/mL  Draw vancomycin trough level 60 min prior to 2nd dose on 7/9 at approximately 2300  Pharmacy will continue to follow and monitor vancomycin.      Please contact pharmacy at extension [phone] with any questions regarding this assessment.     Thank you for the consult,   Leonor Catalan       Patient brief summary:  Los Alatorre is a 57 y.o. male initiated on antimicrobial therapy with IV Vancomycin for treatment of suspected lower respiratory infection    Drug Allergies:   Review of patient's allergies indicates:  No Known Allergies    Actual Body Weight:   73 kg    Renal Function:   Estimated Creatinine Clearance: 26.6 mL/min (A) (based on SCr of 3.16 mg/dL (H)).,     Dialysis Method (if applicable):  N/A    CBC (last 72 hours):  Recent Labs   Lab Result Units 07/06/23 0318 07/07/23 0306 07/08/23 0328 07/08/23 2203   WBC x10(3)/mcL 15.62* 16.46* 21.30* 25.43*   Hgb g/dL 7.5* 7.1* 9.3* 9.5*   Hct % 22.7* 22.0* 28.7* 30.0*   Platelet x10(3)/mcL 134 130 138 142   Mono % % 9.6 8.1 9.1  --    Monocytes % %  --   --   --  1*   Eosinophils % %  --   --   --  2   Eos % % 1.3 1.1 1.0  --    Basophil % % 0.6 0.6 0.7  --        Metabolic Panel (last 72 hours):  Recent Labs   Lab Result Units 07/06/23 0318 07/07/23 0306 07/08/23 0328 07/08/23  2202   Sodium Level mmol/L 143 142 145 146*   Potassium Level mmol/L 3.4* 4.0 3.4* 4.3   Chloride mmol/L 105 106 108* 110*   Carbon Dioxide mmol/L 23 24 22 23   Glucose Level mg/dL 111* 139* 129* 120*   Blood Urea Nitrogen mg/dL 16.5 17.0 17.2 18.9   Creatinine mg/dL 2.56* 2.68* 3.12* 3.16*   Albumin Level g/dL 3.9 4.1 4.0 4.0   Bilirubin Total mg/dL 3.7* 4.5* 5.0* 4.2*   Alkaline Phosphatase unit/L 128 124 139 148    Aspartate Aminotransferase unit/L 19 19 19 32   Alanine Aminotransferase unit/L 5 5 <5 5   Magnesium Level mg/dL 1.60 2.00 1.90 1.90   Phosphorus Level mg/dL 2.5 2.8 3.1  --        Drug levels (last 3 results):  No results for input(s): VANCOMYCINRA, VANCORANDOM, VANCOMYCINPE, VANCOPEAK, VANCOMYCINTR, VANCOTROUGH in the last 72 hours.    Microbiologic Results:  Microbiology Results (last 7 days)       Procedure Component Value Units Date/Time    Blood Culture [[id number]]  (Normal) Collected: 07/04/23 1550    Order Status: Completed Specimen: Blood from Arm, Right Updated: 07/08/23 1800     CULTURE, BLOOD ([facility]) No Growth At 96 Hours    Blood Culture [[id number]]  (Normal) Collected: 07/04/23 1550    Order Status: Completed Specimen: Blood from Hand, Right Updated: 07/08/23 1800     CULTURE, BLOOD ([facility]) No Growth At 96 Hours    Blood Culture [[id number]] Collected: 07/08/23 1453    Order Status: Resulted Specimen: Blood from Hand, Left Updated: 07/08/23 1459    Blood Culture [[id number]] Collected: 07/08/23 1453    Order Status: Resulted Specimen: Blood from Hand, Right Updated: 07/08/23 1459    Body Fluid Culture [[id number]] Collected: 07/04/23 1623    Order Status: Completed Specimen: Abdominal Fluid from Abdomen Updated: 07/08/23 0800     Body Fluid Culture No growth at 4 days    Blood Culture [[id number]]  (Normal) Collected: 06/30/23 1152    Order Status: Completed Specimen: Blood from Arm, Left Updated: 07/05/23 1500     CULTURE, BLOOD ([facility]) No Growth at 5 days    Blood Culture [[id number]]  (Normal) Collected: 06/30/23 1152    Order Status: Completed Specimen: Blood from Arm, Right Updated: 07/05/23 1500     CULTURE, BLOOD ([facility]) No Growth at 5 days    Body Fluid Culture [[id number]] Collected: 06/30/23 1757    Order Status: Completed Specimen: Abdominal Fluid from Abdomen Updated: 07/05/23 0715     Body Fluid Culture Final Report: At 5 days. No growth    Gram Stain [[id number]] Collected: 07/04/23 1623    Order  Status: Completed Specimen: Body Fluid from Abdomen Updated: 07/05/23 0625     GRAM STAIN Rare WBC observed      No bacteria seen    Culture, Body Fluid (Aerobic) w/ GS [[id number]] Collected: 07/04/23 1609    Order Status: Canceled Specimen: Body Fluid from Ascites Updated: 07/04/23 1602

## 2023-09-25 ENCOUNTER — COMPREHENSIVE EXAM (OUTPATIENT)
Dept: RURAL CLINIC 1 | Facility: CLINIC | Age: 68
End: 2023-09-25

## 2023-09-25 DIAGNOSIS — H25.13: ICD-10-CM

## 2023-09-25 DIAGNOSIS — H52.03: ICD-10-CM

## 2023-09-25 PROCEDURE — 92015 DETERMINE REFRACTIVE STATE: CPT

## 2023-09-25 PROCEDURE — 92014 COMPRE OPH EXAM EST PT 1/>: CPT

## 2023-09-25 ASSESSMENT — VISUAL ACUITY
OS_CC: 20/25
OD_CC: 20/30
OU_CC: 20/20

## 2023-09-25 ASSESSMENT — TONOMETRY
OS_IOP_MMHG: 15
OD_IOP_MMHG: 15

## 2025-04-30 ENCOUNTER — COMPREHENSIVE EXAM (OUTPATIENT)
Age: 70
End: 2025-04-30

## 2025-04-30 DIAGNOSIS — H25.13: ICD-10-CM

## 2025-04-30 PROCEDURE — 92014 COMPRE OPH EXAM EST PT 1/>: CPT

## (undated) DEVICE — PAD,ABDOMINAL,5"X9",STERILE,LF,1/PK: Brand: MEDLINE INDUSTRIES, INC.

## (undated) DEVICE — REM POLYHESIVE ADULT PATIENT RETURN ELECTRODE: Brand: VALLEYLAB

## (undated) DEVICE — SUTURE MCRYL SZ 4-0 L18IN ABSRB UD L19MM PS-2 3/8 CIR PRIM Y496G

## (undated) DEVICE — SPONGE GZ W4XL4IN COT 12 PLY TYP VII WVN C FLD DSGN

## (undated) DEVICE — BAG DRAIN URIN 2000ML LF STRL -- CONVERT TO ITEM 363123

## (undated) DEVICE — DEVICE SECUREMENT AD W/ TRICOT ANCHR PD FOR F LTX SIL CATH

## (undated) DEVICE — SOLUTION IV 1000ML 0.9% SOD CHL

## (undated) DEVICE — SUTURE VCRL SZ 3-0 L27IN ABSRB UD L26MM SH 1/2 CIR J416H

## (undated) DEVICE — PACK,LITHOTOMY,PK I: Brand: MEDLINE

## (undated) DEVICE — CATH URETH FOL 2W SH 12FRX5ML -- CONVERT TO ITEM 363070

## (undated) DEVICE — Device

## (undated) DEVICE — BSHR MAJOR BASIN PACK-LF: Brand: MEDLINE INDUSTRIES, INC.